# Patient Record
Sex: FEMALE | Race: WHITE | NOT HISPANIC OR LATINO | Employment: PART TIME | URBAN - METROPOLITAN AREA
[De-identification: names, ages, dates, MRNs, and addresses within clinical notes are randomized per-mention and may not be internally consistent; named-entity substitution may affect disease eponyms.]

---

## 2018-09-23 ENCOUNTER — OFFICE VISIT (OUTPATIENT)
Dept: URGENT CARE | Facility: CLINIC | Age: 38
End: 2018-09-23
Payer: COMMERCIAL

## 2018-09-23 VITALS
HEART RATE: 64 BPM | BODY MASS INDEX: 29.66 KG/M2 | WEIGHT: 178 LBS | RESPIRATION RATE: 18 BRPM | HEIGHT: 65 IN | SYSTOLIC BLOOD PRESSURE: 118 MMHG | TEMPERATURE: 98 F | OXYGEN SATURATION: 98 % | DIASTOLIC BLOOD PRESSURE: 65 MMHG

## 2018-09-23 DIAGNOSIS — R30.9 PAINFUL URINATION: ICD-10-CM

## 2018-09-23 DIAGNOSIS — N30.01 ACUTE CYSTITIS WITH HEMATURIA: Primary | ICD-10-CM

## 2018-09-23 LAB
SL AMB  POCT GLUCOSE, UA: ABNORMAL
SL AMB LEUKOCYTE ESTERASE,UA: ABNORMAL
SL AMB POCT BILIRUBIN,UA: ABNORMAL
SL AMB POCT BLOOD,UA: ABNORMAL
SL AMB POCT CLARITY,UA: ABNORMAL
SL AMB POCT COLOR,UA: YELLOW
SL AMB POCT KETONES,UA: ABNORMAL
SL AMB POCT NITRITE,UA: ABNORMAL
SL AMB POCT PH,UA: 7
SL AMB POCT SPECIFIC GRAVITY,UA: 1.01
SL AMB POCT URINE PROTEIN: ABNORMAL
SL AMB POCT UROBILINOGEN: ABNORMAL

## 2018-09-23 PROCEDURE — 99282 EMERGENCY DEPT VISIT SF MDM: CPT | Performed by: FAMILY MEDICINE

## 2018-09-23 PROCEDURE — G0381 LEV 2 HOSP TYPE B ED VISIT: HCPCS | Performed by: FAMILY MEDICINE

## 2018-09-23 RX ORDER — NITROFURANTOIN 25; 75 MG/1; MG/1
100 CAPSULE ORAL 2 TIMES DAILY
Qty: 14 CAPSULE | Refills: 0 | Status: SHIPPED | OUTPATIENT
Start: 2018-09-23 | End: 2018-09-30

## 2018-09-23 NOTE — PROGRESS NOTES
Assessment/Plan:    Recommend plenty of fluids  Follow-up if not a lot better in 5 days  Diagnoses and all orders for this visit:    Acute cystitis with hematuria  -     nitrofurantoin (MACROBID) 100 mg capsule; Take 1 capsule (100 mg total) by mouth 2 (two) times a day for 7 days    Painful urination  -     POCT urine dip    Other orders  -     levonorgestrel (MIRENA) 20 MCG/24HR IUD; 1 each by Intrauterine route once          Subjective:      Patient ID: Jessica Ramesh is a 45 y o  female  Patient presents with:  Possible UTI: pain, burning and frequency upon urination            The following portions of the patient's history were reviewed and updated as appropriate: allergies, current medications, past family history, past medical history, past social history, past surgical history and problem list     Review of Systems   Constitutional: Negative  HENT: Negative  Eyes: Negative  Respiratory: Negative  Cardiovascular: Negative  Gastrointestinal: Negative  Endocrine: Negative  Genitourinary: Positive for dysuria and urgency  Musculoskeletal: Negative  Skin: Negative  Allergic/Immunologic: Negative  Neurological: Negative  Hematological: Negative  Psychiatric/Behavioral: Negative  All other systems reviewed and are negative  Objective:      /65   Pulse 64   Temp 98 °F (36 7 °C) (Tympanic)   Resp 18   Ht 5' 5" (1 651 m)   Wt 80 7 kg (178 lb)   SpO2 98%   BMI 29 62 kg/m²          Physical Exam   Constitutional: She is oriented to person, place, and time  She appears well-developed and well-nourished  HENT:   Head: Normocephalic and atraumatic  Right Ear: External ear normal    Left Ear: External ear normal    Nose: Nose normal    Mouth/Throat: Oropharynx is clear and moist    Eyes: Conjunctivae and EOM are normal  Pupils are equal, round, and reactive to light  Neck: Normal range of motion  Neck supple     Cardiovascular: Normal rate, regular rhythm and normal heart sounds  Pulmonary/Chest: Effort normal and breath sounds normal    Abdominal: Soft  Bowel sounds are normal    Musculoskeletal: Normal range of motion  Neurological: She is alert and oriented to person, place, and time  She has normal reflexes  Skin: Skin is warm and dry  Psychiatric: She has a normal mood and affect  Her behavior is normal    Nursing note and vitals reviewed

## 2019-09-12 ENCOUNTER — APPOINTMENT (OUTPATIENT)
Dept: LAB | Facility: CLINIC | Age: 39
End: 2019-09-12

## 2019-09-12 ENCOUNTER — TRANSCRIBE ORDERS (OUTPATIENT)
Dept: ADMINISTRATIVE | Facility: HOSPITAL | Age: 39
End: 2019-09-12

## 2019-09-12 DIAGNOSIS — Z02.1 PHYSICAL EXAM, PRE-EMPLOYMENT: ICD-10-CM

## 2019-09-12 DIAGNOSIS — Z02.1 PHYSICAL EXAM, PRE-EMPLOYMENT: Primary | ICD-10-CM

## 2019-09-12 PROCEDURE — 36415 COLL VENOUS BLD VENIPUNCTURE: CPT

## 2019-09-12 PROCEDURE — 86787 VARICELLA-ZOSTER ANTIBODY: CPT

## 2019-09-12 PROCEDURE — 86480 TB TEST CELL IMMUN MEASURE: CPT

## 2019-09-13 LAB
GAMMA INTERFERON BACKGROUND BLD IA-ACNC: 0.08 IU/ML
M TB IFN-G BLD-IMP: NEGATIVE
M TB IFN-G CD4+ BCKGRND COR BLD-ACNC: 0 IU/ML
M TB IFN-G CD4+ BCKGRND COR BLD-ACNC: 0.01 IU/ML
MITOGEN IGNF BCKGRD COR BLD-ACNC: 7.04 IU/ML

## 2019-09-17 LAB — VZV IGG SER IA-ACNC: NORMAL

## 2024-05-22 ENCOUNTER — DOCTOR'S OFFICE (OUTPATIENT)
Dept: URBAN - NONMETROPOLITAN AREA CLINIC 1 | Facility: CLINIC | Age: 44
Setting detail: OPHTHALMOLOGY
End: 2024-05-22
Payer: COMMERCIAL

## 2024-05-22 ENCOUNTER — RX ONLY (RX ONLY)
Age: 44
End: 2024-05-22

## 2024-05-22 DIAGNOSIS — H52.13: ICD-10-CM

## 2024-05-22 PROBLEM — Z01.00 GOOD OCULAR HEALTH OU: Status: ACTIVE | Noted: 2024-05-22

## 2024-05-22 PROCEDURE — 92004 COMPRE OPH EXAM NEW PT 1/>: CPT | Performed by: OPTOMETRIST

## 2024-05-22 ASSESSMENT — CONFRONTATIONAL VISUAL FIELD TEST (CVF)
OS_FINDINGS: FULL
OD_FINDINGS: FULL

## 2025-05-23 ENCOUNTER — DOCTOR'S OFFICE (OUTPATIENT)
Dept: URBAN - NONMETROPOLITAN AREA CLINIC 1 | Facility: CLINIC | Age: 45
Setting detail: OPHTHALMOLOGY
End: 2025-05-23
Payer: COMMERCIAL

## 2025-05-23 ENCOUNTER — RX ONLY (RX ONLY)
Age: 45
End: 2025-05-23

## 2025-05-23 DIAGNOSIS — H52.4: ICD-10-CM

## 2025-05-23 DIAGNOSIS — H52.223: ICD-10-CM

## 2025-05-23 DIAGNOSIS — H52.13: ICD-10-CM

## 2025-05-23 PROBLEM — Z01.00 ENCOUNTER FOR EXAMINATION OF EYES AND VISION WITHOUT ABNORMAL FINDINGS 
- GOOD OCULAR HEALTH: Status: ACTIVE | Noted: 2025-05-23

## 2025-05-23 PROCEDURE — 92015 DETERMINE REFRACTIVE STATE: CPT

## 2025-05-23 PROCEDURE — 92014 COMPRE OPH EXAM EST PT 1/>: CPT

## 2025-05-23 ASSESSMENT — REFRACTION_MANIFEST
OS_CYLINDER: -0.50
OS_SPHERE: -3.25
OD_ADD: +1.25
OD_SPHERE: -2.75
OS_ADD: +1.25
OD_AXIS: 010
OD_VA2: 20/25+2
OS_AXIS: 170
OS_VA1: 20/20
OU_VA: 20/20
OD_CYLINDER: -0.50
OD_VA1: 20/20
OS_VA2: 20/25+2

## 2025-05-23 ASSESSMENT — VISUAL ACUITY
OS_BCVA: 20/20-1
OD_BCVA: 20/40+2

## 2025-05-23 ASSESSMENT — CONFRONTATIONAL VISUAL FIELD TEST (CVF)
OD_FINDINGS: FULL
OS_FINDINGS: FULL

## 2025-05-23 ASSESSMENT — REFRACTION_AUTOREFRACTION
OD_AXIS: 039
OD_SPHERE: -2.25
OD_CYLINDER: -0.50
OS_AXIS: 175
OS_CYLINDER: -0.75
OS_SPHERE: -3.00

## 2025-05-23 ASSESSMENT — REFRACTION_CURRENTRX
OS_OVR_VA: 20/
OD_CYLINDER: -0.50
OD_VPRISM_DIRECTION: SV
OD_AXIS: 004
OD_SPHERE: -2.75
OS_SPHERE: -2.75
OS_CYLINDER: -0.50
OD_OVR_VA: 20/
OS_VPRISM_DIRECTION: SV
OS_AXIS: 179

## 2025-05-23 ASSESSMENT — KERATOMETRY
OD_K2POWER_DIOPTERS: 43.50
OS_K2POWER_DIOPTERS: 44.25
OS_AXISANGLE_DEGREES: 089
OD_K1POWER_DIOPTERS: 42.50
OD_AXISANGLE_DEGREES: 082
OS_K1POWER_DIOPTERS: 42.50

## 2025-05-23 ASSESSMENT — TONOMETRY
OD_IOP_MMHG: 14
OS_IOP_MMHG: 14